# Patient Record
Sex: MALE | Race: BLACK OR AFRICAN AMERICAN | NOT HISPANIC OR LATINO | Employment: FULL TIME | URBAN - METROPOLITAN AREA
[De-identification: names, ages, dates, MRNs, and addresses within clinical notes are randomized per-mention and may not be internally consistent; named-entity substitution may affect disease eponyms.]

---

## 2021-12-13 ENCOUNTER — IMMUNIZATION (OUTPATIENT)
Dept: NURSING | Facility: CLINIC | Age: 46
End: 2021-12-13
Payer: COMMERCIAL

## 2021-12-13 PROCEDURE — 91300 PR COVID VAC PFIZER DIL RECON 30 MCG/0.3 ML IM: CPT

## 2021-12-13 PROCEDURE — 0004A PR COVID VAC PFIZER DIL RECON 30 MCG/0.3 ML IM: CPT

## 2021-12-15 ENCOUNTER — OFFICE VISIT (OUTPATIENT)
Dept: FAMILY MEDICINE | Facility: CLINIC | Age: 46
End: 2021-12-15
Payer: COMMERCIAL

## 2021-12-15 VITALS
SYSTOLIC BLOOD PRESSURE: 128 MMHG | OXYGEN SATURATION: 98 % | HEIGHT: 76 IN | DIASTOLIC BLOOD PRESSURE: 73 MMHG | BODY MASS INDEX: 28.01 KG/M2 | TEMPERATURE: 98.1 F | WEIGHT: 230 LBS | HEART RATE: 105 BPM

## 2021-12-15 DIAGNOSIS — Z23 NEED FOR IMMUNIZATION AGAINST YELLOW FEVER: ICD-10-CM

## 2021-12-15 DIAGNOSIS — Z23 NEED FOR IMMUNIZATION AGAINST TYPHOID: ICD-10-CM

## 2021-12-15 DIAGNOSIS — Z71.84 ENCOUNTER FOR COUNSELING FOR TRAVEL: Primary | ICD-10-CM

## 2021-12-15 DIAGNOSIS — Z23 NEED FOR HEPATITIS A IMMUNIZATION: ICD-10-CM

## 2021-12-15 PROCEDURE — 90471 IMMUNIZATION ADMIN: CPT

## 2021-12-15 PROCEDURE — 90472 IMMUNIZATION ADMIN EACH ADD: CPT | Performed by: PHYSICIAN ASSISTANT

## 2021-12-15 PROCEDURE — 90691 TYPHOID VACCINE IM: CPT

## 2021-12-15 PROCEDURE — 90632 HEPA VACCINE ADULT IM: CPT | Performed by: PHYSICIAN ASSISTANT

## 2021-12-15 PROCEDURE — 90717 YELLOW FEVER VACCINE SUBQ: CPT | Performed by: PHYSICIAN ASSISTANT

## 2021-12-15 PROCEDURE — 99401 PREV MED CNSL INDIV APPRX 15: CPT | Mod: 25 | Performed by: PHYSICIAN ASSISTANT

## 2021-12-15 PROCEDURE — 90471 IMMUNIZATION ADMIN: CPT | Performed by: PHYSICIAN ASSISTANT

## 2021-12-15 RX ORDER — ATOVAQUONE AND PROGUANIL HYDROCHLORIDE 250; 100 MG/1; MG/1
1 TABLET, FILM COATED ORAL DAILY
Qty: 40 TABLET | Refills: 0 | Status: SHIPPED | OUTPATIENT
Start: 2021-12-15

## 2021-12-15 RX ORDER — AZITHROMYCIN 500 MG/1
TABLET, FILM COATED ORAL
Qty: 6 TABLET | Refills: 0 | Status: SHIPPED | OUTPATIENT
Start: 2021-12-15

## 2021-12-15 ASSESSMENT — MIFFLIN-ST. JEOR: SCORE: 2029.77

## 2021-12-15 NOTE — PROGRESS NOTES
SUBJECTIVE: Gt Thompson , a 46 year old  male, presents for counseling and information regarding upcoming travel to [place]. Special medical concerns include: none. He anticipates the following unusual exposures: none.    Itinerary:  [place]    Departure Date: 12/16/21 Return date: 01/16/22    Reason for travel (i.e. Business, pleasure): Pleasure    Visiting an urban or rural area?: Urban    Accommodations (i.e. hotel, hostel, friends, family, etc): Family    Women - First day of your last period: N/A    IMMUNIZATION HISTORY  Have you received any vaccinations in the past 4 weeks?  Yes  Have you ever fainted from having your blood drawn or from an injection?  No  Have you ever had a fever reaction to vaccination?  No  Have you ever had any bad reaction or side effect from any vaccination?  No  Have you ever had hepatitis A or B vaccine?  No  Do you live (or work closely) with anyone who has AIDS, an AIDS-like condition, any other immune disorder or who is on chemotherapy for cancer?  No  Have you received any injection of immune globulin or any blood products during the past 12 months?  No    GENERAL MEDICAL HISTORY  Do you have a medical condition that warrants maintenance medication or physician follow-up?  No  Do you have a medical condition that is stable now, but that may recur while traveling?  No  Has your spleen been removed?  No  Have you had an acute illness or a fever in the past 48 hours?  No  Are you pregnant, or might you become pregnant on this trip?  Any chance of pregnancy?  No  Are you breastfeeding?  No  Do you have HIV, AIDS, an AIDS-like condition, any other immune disorder, leukemia or cancer?  No  Do you have a severe combined immunodeficiency disease?  No  Have you had your thymus gland removed or history of problems with your thymus, such as myasthenia gravis, DiGeorge syndrome, or thymoma?  No    Do you have severe thrombocytopenia (low platelet count) or a coagulation  disorder?  No  Have you ever had a convulsion, seizure, epilepsy, neurologic condition or brain infection?  No  Do you have any stomach conditions?  No  Do you have a G6PD deficiency?  No  Do you have severe renal or kidney impairment?  No  Do you have a history of psychiatric problems?  No  Do you have a problem with strange dreams and/or nightmares?  No  Do you have insomnia?  No  Do you have problems with vaginitis?  No  Do you have psoriasis?  No  Are you prone to motion sickness?  No  Have you ever had headaches, nausea, vomiting, or breathing problems from altitude exposure?  No      No past medical history on file.   Immunization History   Administered Date(s) Administered     COVID-19,PF,Pfizer (12+ Yrs) 04/09/2021, 04/30/2021, 12/13/2021     HepA, Unspecified 05/16/2011     MMR 04/02/2002     Td,adult,historic,unspecified 04/02/2002     Tdap (Adacel,Boostrix) 05/16/2011       No current outpatient medications on file.     Allergies   Allergen Reactions     Procaine Unknown        EXAM: deferred    Immunizations discussed include: Hepatitis A, Typhoid and Yellow Fever  Malaria prophylaxis recommended: Malarone  Symptomatic treatment for traveler's diarrhea: bismuth subsalicylate, loperamide/diphenoxylate and azithromycin    ASSESSMENT/PLAN:    (Z71.84) Encounter for counseling for travel  (primary encounter diagnosis)    Comment: Hepatitis A, yellow fever, and typhoid vaccines today. Patient will return or follow-up with PCP as needed. Prophylaxis given for Traveler's diarrhea and Malaria. All questions were answered.     Plan: atovaquone-proguanil (MALARONE) 250-100 MG         tablet, azithromycin (ZITHROMAX) 500 MG tablet            (Z23) Need for immunization against yellow fever  Comment:   Plan: YELLOW FEVER, LIVE SQ            (Z23) Need for immunization against typhoid  Comment:   Plan: TYPHOID VACCINE, IM            (Z23) Need for hepatitis A immunization  Comment:   Plan: HEPATITIS A VACCINE  (ADULT)              I have reviewed general recommendations for safe travel   including: food/water precautions, insect avoidance, safe sex   practices given high prevalence of HIV and other STDs,   roadway safety. Educational materials and links to the CDC   Traveler's health website have been provided.    Total time 20 minutes, greater than 50 percent in counseling   and coordination of care.

## 2022-01-30 ENCOUNTER — HEALTH MAINTENANCE LETTER (OUTPATIENT)
Age: 47
End: 2022-01-30

## 2022-09-25 ENCOUNTER — HEALTH MAINTENANCE LETTER (OUTPATIENT)
Age: 47
End: 2022-09-25

## 2023-02-04 ENCOUNTER — HEALTH MAINTENANCE LETTER (OUTPATIENT)
Age: 48
End: 2023-02-04

## 2024-03-02 ENCOUNTER — HEALTH MAINTENANCE LETTER (OUTPATIENT)
Age: 49
End: 2024-03-02